# Patient Record
Sex: FEMALE | Race: WHITE | NOT HISPANIC OR LATINO | Employment: FULL TIME | URBAN - METROPOLITAN AREA
[De-identification: names, ages, dates, MRNs, and addresses within clinical notes are randomized per-mention and may not be internally consistent; named-entity substitution may affect disease eponyms.]

---

## 2018-02-21 ENCOUNTER — HOSPITAL ENCOUNTER (EMERGENCY)
Facility: HOSPITAL | Age: 38
Discharge: HOME | End: 2018-02-21
Attending: EMERGENCY MEDICINE | Admitting: EMERGENCY MEDICINE
Payer: COMMERCIAL

## 2018-02-21 VITALS
BODY MASS INDEX: 36.1 KG/M2 | TEMPERATURE: 98.3 F | WEIGHT: 230 LBS | HEIGHT: 67 IN | RESPIRATION RATE: 20 BRPM | SYSTOLIC BLOOD PRESSURE: 140 MMHG | HEART RATE: 80 BPM | OXYGEN SATURATION: 98 % | DIASTOLIC BLOOD PRESSURE: 70 MMHG

## 2018-02-21 DIAGNOSIS — R11.2 NAUSEA VOMITING AND DIARRHEA: Primary | ICD-10-CM

## 2018-02-21 DIAGNOSIS — R19.7 NAUSEA VOMITING AND DIARRHEA: Primary | ICD-10-CM

## 2018-02-21 PROCEDURE — 99283 EMERGENCY DEPT VISIT LOW MDM: CPT

## 2018-02-21 RX ORDER — ONDANSETRON 4 MG/1
4 TABLET, ORALLY DISINTEGRATING ORAL EVERY 6 HOURS PRN
Qty: 20 TABLET | Refills: 0 | Status: SHIPPED | OUTPATIENT
Start: 2018-02-21

## 2018-02-21 RX ORDER — ONDANSETRON 4 MG/1
4 TABLET, ORALLY DISINTEGRATING ORAL ONCE
Status: COMPLETED | OUTPATIENT
Start: 2018-02-21 | End: 2018-02-21

## 2018-02-21 RX ADMIN — ONDANSETRON 4 MG: 4 TABLET, ORALLY DISINTEGRATING ORAL at 23:12

## 2018-02-22 NOTE — DISCHARGE INSTRUCTIONS
Take the medication for nausea as needed  Drink plenty of fluids while you are sick, and eat as you are able to tolerate  You should not go back to work until you are no longer having vomiting and diarrhea  Follow up with the primary care doctor to make sure you are getting better  Return if you are no longer able to tolerate fluids, developed persistent fevers greater than 101, develop severe pain, or for any other concerns  Acute Nausea and Vomiting, Ambulatory Care   GENERAL INFORMATION:   Acute nausea and vomiting  starts suddenly, gets worse quickly, and lasts a short time  Nausea and vomiting may be caused by pregnancy, alcohol, infection, or medicines  Common related symptoms include the following:   · Fever    · Abdominal swelling    · Pain, tenderness, or a lump in the abdomen    · Splashing sounds heard in your stomach when you move  Seek immediate care for the following symptoms:   · Blood in your vomit or bowel movements    · Sudden, severe pain in your chest and upper abdomen after hard vomiting    · Dizziness, dry mouth, and thirst    · Urinating very little or not at all    · Muscle weakness, leg cramps, and trouble breathing    · A heart beat that is faster than normal    · Vomiting for more than 48 hours  Treatment for acute nausea and vomiting  may include medicines to calm your stomach and stop the vomiting  You may need IV fluids if you are dehydrated  Manage your nausea and vomiting:   · Drink liquids as directed to prevent dehydration  Ask how much liquid to drink each day and which liquids are best for you  You may need to drink an oral rehydration solution (ORS)  ORS contains water, salts, and sugar that are needed to replace the lost body fluids  Ask what kind of ORS to use, how much to drink, and where to get it  · Eat smaller meals, more often  Eat small amounts of food every 2 to 3 hours, even if you are not hungry   Food in your stomach may help decrease your nausea  · Avoid stress  Find ways to relax and manage your stress  Headaches due to stress may cause nausea and vomiting  Get more rest and sleep  Follow up with your healthcare provider as directed:  Write down your questions so you remember to ask them during your visits  CARE AGREEMENT:   You have the right to help plan your care  Learn about your health condition and how it may be treated  Discuss treatment options with your caregivers to decide what care you want to receive  You always have the right to refuse treatment  The above information is an  only  It is not intended as medical advice for individual conditions or treatments  Talk to your doctor, nurse or pharmacist before following any medical regimen to see if it is safe and effective for you  © 2014 [address] is for End User's use only and may not be sold, redistributed or otherwise used for commercial purposes  All illustrations and images included in CareNotes® are the copyrighted property of [employer] M [employer]  or [employer]  Acute Diarrhea   WHAT YOU NEED TO KNOW:   What is acute diarrhea? Acute diarrhea starts quickly and lasts a short time, usually 1 to 3 days  It can last up to 2 weeks  What causes acute diarrhea? · Bacteria, such as E coli or salmonella    · Viruses, such as rotavirus and norovirus    · A parasite, such as giardia    · Medicines, such as laxatives, antacids, or antibiotics    · An allergy to lactose, soy, or gluten    · Eating food or drinking water that contains germs    · Medical treatments, such as chemotherapy or radiation  What other signs and symptoms might I have with acute diarrhea? You may have 3 or more episodes of diarrhea  It may be hard to control your diarrhea   You may also have any of the following:  · Fever and chills    · Headache or abdominal pain    · Nausea and vomiting    · Symptoms of dehydration such as thirst, decreased urination, dry skin, sunken eyes, or fast, pounding heartbeat  What does my healthcare provider need to know about my acute diarrhea? Your healthcare provider will ask about your symptoms  He or she will ask what you have recently eaten and if you have traveled to other countries  Tell the provider what medicines you use or if you have been around anyone who is sick  Your healthcare provider may check you for signs of dehydration  How is acute diarrhea treated? Acute diarrhea usually gets better without treatment  You may need any of the following if your diarrhea is severe or lasts longer than a few days:  · Diarrhea medicine  is an over-the-counter medicine that helps slow or stop your diarrhea  · Antibiotics  may be given to help treat an infection caused by bacteria  · Parasite medicine  may be given to treat an infection caused by parasites  How can acute diarrhea be managed? · Drink liquids as directed  Liquids will help prevent dehydration caused by diarrhea  Ask your healthcare provider how much liquid to drink each day and which liquids are best for you  You may need to drink an oral rehydration solution (ORS)  An ORS has the right amounts of water, salts, and sugar you need to replace body fluids  You can buy an ORS at most grocery stores and pharmacies  · Eat foods that are easy to digest   Examples include rice, lentils, cereal, bananas, potatoes, and bread  It also includes some fruits (bananas, melon), well-cooked vegetables, and lean meats  Avoid foods high in fiber, fat, and sugar  Also avoid caffeine, alcohol, dairy, and red meat until your diarrhea is gone  How can acute diarrhea be prevented? · Wash your hands often  Use soap and water  Wash your hands before you eat or prepare food  Also wash your hands after you use the bathroom  Use an alcohol-based hand gel when soap and water are not available  · Keep bathroom surfaces clean    This helps prevent the spread of germs that cause acute diarrhea  · Wash fruits and vegetables well before you eat them  This can help remove germs that cause diarrhea  If possible, remove the skin from fruits and vegetables, or cook them well before you eat them  · Cook meat as directed  ¨ Cook ground meat  to 160°F      ¨ Cook ground poultry, whole poultry, or cuts of poultry  to at least 165°F  Remove the meat from heat  Let it stand for 3 minutes before you eat it  ¨ Cook whole cuts of meat other than poultry  to at least 145°F  Remove the meat from heat  Let it stand for 3 minutes before you eat it  · Wash dishes that have touched raw meat with hot water and soap  This includes cutting boards, utensils, dishes, and serving containers  · Place raw or cooked meat in the refrigerator as soon as possible  Bacteria can grow in meat that is left at room temperature too long  · Do not eat raw or undercooked oysters, clams, or mussels  These foods may be contaminated and cause infection  · Drink filtered or treated water only when you travel  Do not put ice in your drinks  Drink bottled water whenever possible  When should I seek immediate care? · You feel confused  · Your heartbeat is faster than normal      · Your eyes look deeply sunken, or you have no tears when you cry  · You urinate less than usual, or your urine is dark yellow  · You have blood or mucus in your stools  · You have severe abdominal pain  · You are unable to drink any liquids  When should I contact my healthcare provider? · Your symptoms do not get better with treatment  · You have a fever higher than 101 3°F (38 5°C)  · You have trouble eating and drinking because you are vomiting  · You are thirsty or have a dry mouth  · Your diarrhea does not get better in 7 days  · You have questions or concerns about your condition or care  CARE AGREEMENT:   You have the right to help plan your care   Learn about your health condition and how it may be treated  Discuss treatment options with your caregivers to decide what care you want to receive  You always have the right to refuse treatment  The above information is an  only  It is not intended as medical advice for individual conditions or treatments  Talk to your doctor, nurse or pharmacist before following any medical regimen to see if it is safe and effective for you  © 2017 [address] Information is for End User's use only and may not be sold, redistributed or otherwise used for commercial purposes  All illustrations and images included in CareNotes® are the copyrighted property of A D A M , Inc  or [address]

## 2019-01-10 ENCOUNTER — HOSPITAL ENCOUNTER (EMERGENCY)
Facility: HOSPITAL | Age: 39
Discharge: HOME | End: 2019-01-11
Attending: EMERGENCY MEDICINE | Admitting: EMERGENCY MEDICINE
Payer: COMMERCIAL

## 2019-01-10 ENCOUNTER — APPOINTMENT (EMERGENCY)
Dept: RADIOLOGY | Facility: HOSPITAL | Age: 39
End: 2019-01-10
Payer: COMMERCIAL

## 2019-01-10 VITALS
HEART RATE: 85 BPM | OXYGEN SATURATION: 97 % | DIASTOLIC BLOOD PRESSURE: 81 MMHG | RESPIRATION RATE: 18 BRPM | TEMPERATURE: 97.9 F | SYSTOLIC BLOOD PRESSURE: 147 MMHG

## 2019-01-10 DIAGNOSIS — M25.569 KNEE PAIN: Primary | ICD-10-CM

## 2019-01-10 DIAGNOSIS — S93.409A ANKLE SPRAIN: ICD-10-CM

## 2019-01-10 DIAGNOSIS — S83.90XA KNEE SPRAIN: ICD-10-CM

## 2019-01-10 PROCEDURE — 99283 EMERGENCY DEPT VISIT LOW MDM: CPT

## 2019-01-10 PROCEDURE — 73610 X-RAY EXAM OF ANKLE: CPT

## 2019-01-10 PROCEDURE — 73564 X-RAY EXAM KNEE 4 OR MORE: CPT

## 2019-01-10 RX ORDER — IBUPROFEN 600 MG/1
600 TABLET ORAL ONCE
Status: COMPLETED | OUTPATIENT
Start: 2019-01-10 | End: 2019-01-10

## 2019-01-10 RX ORDER — METHOCARBAMOL 500 MG/1
1000 TABLET, FILM COATED ORAL ONCE
Status: COMPLETED | OUTPATIENT
Start: 2019-01-10 | End: 2019-01-10

## 2019-01-10 RX ORDER — IBUPROFEN 600 MG/1
600 TABLET ORAL EVERY 6 HOURS PRN
Qty: 30 TABLET | Refills: 0 | Status: SHIPPED | OUTPATIENT
Start: 2019-01-10 | End: 2019-01-13

## 2019-01-10 RX ORDER — METHOCARBAMOL 500 MG/1
1000 TABLET, FILM COATED ORAL 2 TIMES DAILY
Qty: 30 TABLET | Refills: 0 | Status: SHIPPED | OUTPATIENT
Start: 2019-01-10

## 2019-01-10 RX ADMIN — IBUPROFEN 600 MG: 600 TABLET ORAL at 23:49

## 2019-01-10 RX ADMIN — METHOCARBAMOL TABLETS 1000 MG: 500 TABLET, COATED ORAL at 23:49

## 2019-01-11 NOTE — ED PROVIDER NOTES
History  Chief Complaint   Patient presents with    Knee Injury     Pt states she slipped down stairs this morning and hurt her right knee and ankle  Denies head injury or LOC     19-year-old female patient presents emergency department for evaluation of knee and ankle injury sustained in a fall down 15 steps early this morning  The patient was able to work for 6 hours straight on the injured foot and knee but wanted to make sure that nothing was broken so she came in today for evaluation  Radiographic studies ordered  History provided by:  Patient   used: No    Knee Pain   Location:  Knee and ankle  Knee location:  R knee  Ankle location:  R ankle  Pain details:     Quality:  Aching    Radiates to:  Does not radiate    Severity:  Mild    Onset quality:  Gradual    Timing:  Constant    Progression:  Worsening  Chronicity:  New  Prior injury to area:  Yes  Relieved by:  Nothing  Worsened by:  Nothing  Ineffective treatments:  None tried  Associated symptoms: no decreased ROM, no fever, no itching and no numbness    Risk factors: no concern for non-accidental trauma        Prior to Admission Medications   Prescriptions Last Dose Informant Patient Reported? Taking?   ondansetron (ZOFRAN-ODT) 4 mg disintegrating tablet   No No   Sig: Take 1 tablet (4 mg total) by mouth every 6 (six) hours as needed for nausea or vomiting      Facility-Administered Medications: None       History reviewed  No pertinent past medical history  History reviewed  No pertinent surgical history  History reviewed  No pertinent family history  I have reviewed and agree with the history as documented  Social History   Substance Use Topics    Smoking status: Current Every Day Smoker     Packs/day: 0 20     Types: Cigarettes    Smokeless tobacco: Never Used    Alcohol use No        Review of Systems   Constitutional: Negative for fever  Skin: Negative for itching     All other systems reviewed and are negative  Physical Exam  Physical Exam   Constitutional: She is oriented to person, place, and time  She appears well-developed and well-nourished  HENT:   Head: Normocephalic and atraumatic  Right Ear: External ear normal    Left Ear: External ear normal    Eyes: Conjunctivae and EOM are normal    Neck: No JVD present  No tracheal deviation present  No thyromegaly present  Cardiovascular: Normal rate  Pulmonary/Chest: Effort normal and breath sounds normal  No stridor  Abdominal: Soft  She exhibits no distension and no mass  There is no tenderness  There is no guarding  No hernia  Musculoskeletal: Normal range of motion  She exhibits no edema, tenderness or deformity  Lymphadenopathy:     She has no cervical adenopathy  Neurological: She is alert and oriented to person, place, and time  Skin: Skin is warm  No rash noted  No erythema  No pallor  Psychiatric: She has a normal mood and affect  Her behavior is normal    Nursing note and vitals reviewed        Vital Signs  ED Triage Vitals   Temperature Pulse Respirations Blood Pressure SpO2   01/10/19 2245 01/10/19 2241 01/10/19 2241 01/10/19 2241 01/10/19 2241   97 9 °F (36 6 °C) 85 18 147/81 97 %      Temp Source Heart Rate Source Patient Position - Orthostatic VS BP Location FiO2 (%)   01/10/19 2245 01/10/19 2241 01/10/19 2241 01/10/19 2241 --   Oral Monitor Lying Right arm       Pain Score       01/10/19 2241       Worst Possible Pain           Vitals:    01/10/19 2241   BP: 147/81   Pulse: 85   Patient Position - Orthostatic VS: Lying       Visual Acuity      ED Medications  Medications   methocarbamol (ROBAXIN) tablet 1,000 mg (1,000 mg Oral Given 1/10/19 2349)   ibuprofen (MOTRIN) tablet 600 mg (600 mg Oral Given 1/10/19 2349)       Diagnostic Studies  Results Reviewed     None                 XR ankle 3+ vw right   ED Interpretation by Nita Arrington DO (01/10 2341)   No acute fracture       by Randa Rodriguez (01/10 2336)      XR knee 4+ vw right injury   ED Interpretation by Hilario Davila DO (01/10 1497)   No acute osseous abnormality                 Procedures  Procedures       Phone Contacts  ED Phone Contact    ED Course                               MDM  Number of Diagnoses or Management Options  Ankle sprain: new and requires workup  Knee pain: new and requires workup  Knee sprain: new and requires workup     Amount and/or Complexity of Data Reviewed  Tests in the radiology section of CPT®: ordered and reviewed  Decide to obtain previous medical records or to obtain history from someone other than the patient: yes  Review and summarize past medical records: yes    Patient Progress  Patient progress: stable    CritCare Time    Disposition  Final diagnoses:   Knee pain   Ankle sprain   Knee sprain     Time reflects when diagnosis was documented in both MDM as applicable and the Disposition within this note     Time User Action Codes Description Comment    1/10/2019 11:42 PM Ramana Torrey Add [M25 [id number]] Knee pain     1/10/2019 11:42 PM Ramana Torrey Add [S93 409A] Ankle sprain     1/10/2019 11:43 PM Ramana Torrey Add [S83 [id number]] Knee sprain       ED Disposition     ED Disposition Condition Comment    Discharge  Rona Flynn discharge to home/self care      Condition at discharge: Good        Follow-up Information     Follow up With Specialties Details Why Contact Info    [facility], [address]   [address] [facility] [address]  [address]            Discharge Medication List as of 1/10/2019 11:44 PM      START taking these medications    Details   ibuprofen (MOTRIN) 600 mg tablet Take 1 tablet (600 mg total) by mouth every 6 (six) hours as needed for mild pain for up to 3 days, Starting Thu 1/10/2019, Until Sun 1/13/2019, Print      methocarbamol (ROBAXIN) 500 mg tablet Take 2 tablets (1,000 mg total) by mouth 2 (two) times a day, Starting Thu 1/10/2019, Print         CONTINUE these medications which have NOT CHANGED    Details   ondansetron (ZOFRAN-ODT) 4 mg disintegrating tablet Take 1 tablet (4 mg total) by mouth every 6 (six) hours as needed for nausea or vomiting, Starting Wed 2/21/2018, Print           No discharge procedures on file      ED Provider  Electronically Signed by           Arpan Fay,   01/11/19 110 Joe Becerril, DO  01/11/19 [id number]

## 2019-05-15 ENCOUNTER — HOSPITAL ENCOUNTER (EMERGENCY)
Facility: HOSPITAL | Age: 39
Discharge: HOME | End: 2019-05-16
Attending: EMERGENCY MEDICINE | Admitting: EMERGENCY MEDICINE
Payer: COMMERCIAL

## 2019-05-15 ENCOUNTER — APPOINTMENT (EMERGENCY)
Dept: RADIOLOGY | Facility: HOSPITAL | Age: 39
End: 2019-05-15
Payer: COMMERCIAL

## 2019-05-15 VITALS
BODY MASS INDEX: 44.5 KG/M2 | HEART RATE: 75 BPM | WEIGHT: 283.51 LBS | TEMPERATURE: 98.7 F | OXYGEN SATURATION: 98 % | RESPIRATION RATE: 18 BRPM | DIASTOLIC BLOOD PRESSURE: 89 MMHG | HEIGHT: 67 IN | SYSTOLIC BLOOD PRESSURE: 140 MMHG

## 2019-05-15 DIAGNOSIS — S63.502A LEFT WRIST SPRAIN, INITIAL ENCOUNTER: Primary | ICD-10-CM

## 2019-05-15 PROCEDURE — 99283 EMERGENCY DEPT VISIT LOW MDM: CPT

## 2019-05-15 PROCEDURE — 99283 EMERGENCY DEPT VISIT LOW MDM: CPT | Performed by: EMERGENCY MEDICINE

## 2019-05-15 PROCEDURE — 73110 X-RAY EXAM OF WRIST: CPT

## 2019-05-15 RX ORDER — IBUPROFEN 400 MG/1
400 TABLET ORAL ONCE
Status: DISCONTINUED | OUTPATIENT
Start: 2019-05-16 | End: 2019-05-16

## 2019-05-16 RX ORDER — IBUPROFEN 400 MG/1
400 TABLET ORAL ONCE
Status: COMPLETED | OUTPATIENT
Start: 2019-05-16 | End: 2019-05-16

## 2019-05-16 RX ORDER — SENNOSIDES 8.6 MG
650 CAPSULE ORAL EVERY 8 HOURS PRN
Qty: 12 TABLET | Refills: 0 | Status: SHIPPED | OUTPATIENT
Start: 2019-05-16 | End: 2019-05-20

## 2019-05-16 RX ORDER — IBUPROFEN 400 MG/1
400 TABLET ORAL EVERY 6 HOURS PRN
Qty: 16 TABLET | Refills: 0 | Status: SHIPPED | OUTPATIENT
Start: 2019-05-16 | End: 2019-05-20

## 2019-05-16 RX ADMIN — IBUPROFEN 400 MG: 400 TABLET ORAL at 00:21

## 2019-08-28 ENCOUNTER — HOSPITAL ENCOUNTER (EMERGENCY)
Facility: HOSPITAL | Age: 39
Discharge: HOME | End: 2019-08-29
Attending: EMERGENCY MEDICINE
Payer: COMMERCIAL

## 2019-08-28 VITALS
DIASTOLIC BLOOD PRESSURE: 92 MMHG | TEMPERATURE: 98.5 F | SYSTOLIC BLOOD PRESSURE: 161 MMHG | HEART RATE: 83 BPM | RESPIRATION RATE: 18 BRPM | OXYGEN SATURATION: 95 %

## 2019-08-28 DIAGNOSIS — J02.9 PHARYNGITIS: Primary | ICD-10-CM

## 2019-08-28 PROCEDURE — 99282 EMERGENCY DEPT VISIT SF MDM: CPT

## 2019-08-28 PROCEDURE — 99283 EMERGENCY DEPT VISIT LOW MDM: CPT | Performed by: EMERGENCY MEDICINE

## 2019-09-04 NOTE — ED PROVIDER NOTES
History  Chief Complaint   Patient presents with    Sore Throat     pt states she started with a sore throat this morning     40-year-old female presenting to the emergency department for evaluation of sore throat  Patient has had sore throat since this morning, wanted to make sure that she did not have anything that can be transmitted to her young son wanted to be treated with antibiotics, no fevers chills, no lymphadenopathy, does have cough  No other known sick contacts  Prior to Admission Medications   Prescriptions Last Dose Informant Patient Reported? Taking?   ibuprofen (MOTRIN) 400 mg tablet   No No   Sig: Take 1 tablet (400 mg total) by mouth every 6 (six) hours as needed for mild pain for up to 4 days   ibuprofen (MOTRIN) 600 mg tablet   No No   Sig: Take 1 tablet (600 mg total) by mouth every 6 (six) hours as needed for mild pain for up to 3 days   methocarbamol (ROBAXIN) 500 mg tablet   No No   Sig: Take 2 tablets (1,000 mg total) by mouth 2 (two) times a day   ondansetron (ZOFRAN-ODT) 4 mg disintegrating tablet   No No   Sig: Take 1 tablet (4 mg total) by mouth every 6 (six) hours as needed for nausea or vomiting      Facility-Administered Medications: None       History reviewed  No pertinent past medical history  History reviewed  No pertinent surgical history  History reviewed  No pertinent family history  I have reviewed and agree with the history as documented  Social History     Tobacco Use    Smoking status: Current Every Day Smoker     Packs/day: 0 20     Types: Cigarettes    Smokeless tobacco: Never Used   Substance Use Topics    Alcohol use: No    Drug use: No        Review of Systems   Constitutional: Negative for appetite change, chills, fatigue and fever  HENT: Positive for sore throat  Negative for sneezing  Eyes: Negative for visual disturbance  Respiratory: Negative for cough, choking, chest tightness, shortness of breath and wheezing      Cardiovascular: Negative for chest pain and palpitations  Gastrointestinal: Negative for abdominal pain, constipation, diarrhea, nausea and vomiting  Genitourinary: Negative for difficulty urinating and dysuria  Neurological: Negative for dizziness, weakness, light-headedness, numbness and headaches  All other systems reviewed and are negative  Physical Exam  Physical Exam   Constitutional: She is oriented to person, place, and time  She appears well-developed and well-nourished  No distress  HENT:   Head: Normocephalic and atraumatic  Mouth/Throat: Posterior oropharyngeal erythema present  Eyes: Pupils are equal, round, and reactive to light  EOM are normal    Neck: No JVD present  No tracheal deviation present  Cardiovascular: Normal rate, regular rhythm, normal heart sounds and intact distal pulses  Exam reveals no gallop and no friction rub  No murmur heard  Pulmonary/Chest: Effort normal and breath sounds normal  No respiratory distress  She has no wheezes  She has no rales  Abdominal: Soft  Bowel sounds are normal  She exhibits no distension  There is no tenderness  There is no rebound and no guarding  Neurological: She is alert and oriented to person, place, and time  No cranial nerve deficit  She exhibits normal muscle tone  Skin: Skin is warm and dry  She is not diaphoretic  No pallor  Psychiatric: She has a normal mood and affect  Her behavior is normal    Nursing note and vitals reviewed        Vital Signs  ED Triage Vitals   Temperature Pulse Respirations Blood Pressure SpO2   08/28/19 2340 08/28/19 2337 08/28/19 2337 08/28/19 2338 08/28/19 2337   98 5 °F (36 9 °C) 83 18 161/92 95 %      Temp Source Heart Rate Source Patient Position - Orthostatic VS BP Location FiO2 (%)   08/28/19 2337 08/28/19 2337 08/28/19 2337 08/28/19 2337 --   Oral Monitor Sitting Right arm       Pain Score       --                  Vitals:    08/28/19 2337 08/28/19 2338   BP:  161/92   Pulse: 83    Patient Position - Orthostatic VS: Sitting          Visual Acuity      ED Medications  Medications - No data to display    Diagnostic Studies  Results Reviewed     None                 No orders to display              Procedures  Procedures       ED Course                               MDM  Number of Diagnoses or Management Options  Pharyngitis:   Diagnosis management comments: 70-year-old female with pharyngitis, most likely viral, discussed that while there is no specific treatment other than supportive care, she should still be careful with covering her mouth or coughing, and frequent hand washing to avoid transmission of viral infection  Disposition  Final diagnoses:   Pharyngitis     Time reflects when diagnosis was documented in both MDM as applicable and the Disposition within this note     Time User Action Codes Description Comment    8/28/2019 11:48 PM Rajani Fitzpatrick Add [J02 9] Pharyngitis       ED Disposition     ED Disposition Condition Date/Time Comment    Discharge Stable Wed Aug 28, 2019 11:48 PM Rupali Girard discharge to home/self care              Follow-up Information    None         Discharge Medication List as of 8/28/2019 11:48 PM      CONTINUE these medications which have NOT CHANGED    Details   ibuprofen (MOTRIN) 400 mg tablet Take 1 tablet (400 mg total) by mouth every 6 (six) hours as needed for mild pain for up to 4 days, Starting Thu 5/16/2019, Until Mon 5/20/2019, Print      ibuprofen (MOTRIN) 600 mg tablet Take 1 tablet (600 mg total) by mouth every 6 (six) hours as needed for mild pain for up to 3 days, Starting Thu 1/10/2019, Until Sun 1/13/2019, Print      methocarbamol (ROBAXIN) 500 mg tablet Take 2 tablets (1,000 mg total) by mouth 2 (two) times a day, Starting Thu 1/10/2019, Print      ondansetron (ZOFRAN-ODT) 4 mg disintegrating tablet Take 1 tablet (4 mg total) by mouth every 6 (six) hours as needed for nausea or vomiting, Starting Wed 2/21/2018, Print           No discharge procedures on file     ED Provider  Electronically Signed by           Mandy Marie MD  09/04/19 [id number]

## 2020-08-16 ENCOUNTER — HOSPITAL ENCOUNTER (EMERGENCY)
Facility: HOSPITAL | Age: 40
Discharge: HOME | End: 2020-08-16
Attending: EMERGENCY MEDICINE
Payer: COMMERCIAL

## 2020-08-16 ENCOUNTER — APPOINTMENT (EMERGENCY)
Dept: RADIOLOGY | Facility: HOSPITAL | Age: 40
End: 2020-08-16
Payer: COMMERCIAL

## 2020-08-16 VITALS
SYSTOLIC BLOOD PRESSURE: 134 MMHG | TEMPERATURE: 98.2 F | OXYGEN SATURATION: 97 % | WEIGHT: 280 LBS | HEIGHT: 67 IN | BODY MASS INDEX: 43.95 KG/M2 | HEART RATE: 81 BPM | DIASTOLIC BLOOD PRESSURE: 100 MMHG | RESPIRATION RATE: 17 BRPM

## 2020-08-16 DIAGNOSIS — M72.2 PLANTAR FASCIITIS: Primary | ICD-10-CM

## 2020-08-16 PROCEDURE — 99284 EMERGENCY DEPT VISIT MOD MDM: CPT | Performed by: PHYSICIAN ASSISTANT

## 2020-08-16 PROCEDURE — 73610 X-RAY EXAM OF ANKLE: CPT

## 2020-08-16 PROCEDURE — 99283 EMERGENCY DEPT VISIT LOW MDM: CPT

## 2020-08-16 PROCEDURE — 73630 X-RAY EXAM OF FOOT: CPT

## 2020-08-16 RX ORDER — IBUPROFEN 400 MG/1
800 TABLET ORAL ONCE
Status: COMPLETED | OUTPATIENT
Start: 2020-08-16 | End: 2020-08-16

## 2020-08-16 RX ADMIN — IBUPROFEN 800 MG: 400 TABLET ORAL at 17:25

## 2020-08-16 NOTE — DISCHARGE INSTRUCTIONS
Take ibuprofen 400mg every 6 hours as needed for pain  Elevate your extremity and apply ice  Buy a sole insert for your feet  Follow-up with podiatry if no better in 2 weeks  Return to ER with any worsening symptoms

## 2020-08-16 NOTE — ED PROVIDER NOTES
History  Chief Complaint   Patient presents with    Foot Pain     Patient presents to ED with co right foot pain  Patient denies injuries  42yo female who is otherwise healthy presenting for right foot pain x 1 week  Patient denies known injury but states she works 70 hours per week in [employer] and is on her feet a lot  Patient is worried that she may have a stress fracture  Pain was worsening today while at work and her boss sent her here for evaluation  Pain is primarily located at the heel  Pain is worse in the morning and gradually improves throughout the day  Patient denies numbness, tingling, other symptoms  No previous injuries to the area  History provided by:  Patient   used: No    Ankle Pain   Location:  Foot  Time since incident:  1 week  Injury: no    Foot location:  R foot  Pain details:     Quality:  Pressure    Radiates to:  Does not radiate    Severity:  Moderate    Onset quality:  Gradual    Duration:  1 week    Timing:  Constant    Progression:  Worsening  Chronicity:  New  Dislocation: no    Prior injury to area:  No  Relieved by:  Nothing  Worsened by:  Bearing weight and activity  Ineffective treatments:  None tried  Associated symptoms: no back pain, no decreased ROM, no fatigue, no fever, no itching, no muscle weakness, no neck pain, no numbness, no stiffness, no swelling and no tingling        Prior to Admission Medications   Prescriptions Last Dose Informant Patient Reported?  Taking?   ibuprofen (MOTRIN) 400 mg tablet   No No   Sig: Take 1 tablet (400 mg total) by mouth every 6 (six) hours as needed for mild pain for up to 4 days   methocarbamol (ROBAXIN) 500 mg tablet   No No   Sig: Take 2 tablets (1,000 mg total) by mouth 2 (two) times a day   ondansetron (ZOFRAN-ODT) 4 mg disintegrating tablet   No No   Sig: Take 1 tablet (4 mg total) by mouth every 6 (six) hours as needed for nausea or vomiting      Facility-Administered Medications: None       History reviewed  No pertinent past medical history  History reviewed  No pertinent surgical history  History reviewed  No pertinent family history  I have reviewed and agree with the history as documented  E-Cigarette/Vaping    E-Cigarette Use Never User      E-Cigarette/Vaping Substances     Social History     Tobacco Use    Smoking status: Current Every Day Smoker     Packs/day: 0 25     Types: Cigarettes    Smokeless tobacco: Never Used   Substance Use Topics    Alcohol use: Yes     Frequency: Monthly or less     Drinks per session: 1 or 2    Drug use: No       Review of Systems   Constitutional: Negative for chills, fatigue and fever  HENT: Negative for drooling and voice change  Eyes: Negative for discharge and redness  Respiratory: Negative for shortness of breath and stridor  Cardiovascular: Negative for chest pain and leg swelling  Gastrointestinal: Negative for nausea and vomiting  Musculoskeletal: Positive for arthralgias  Negative for back pain, neck pain, neck stiffness and stiffness  Skin: Negative for color change, itching and rash  Neurological: Negative for seizures, syncope and numbness  Psychiatric/Behavioral: Negative for confusion  The patient is not nervous/anxious  All other systems reviewed and are negative  Physical Exam  Physical Exam  Vitals signs and nursing note reviewed  Constitutional:       General: She is not in acute distress  Appearance: She is well-developed  She is not diaphoretic  HENT:      Head: Normocephalic and atraumatic  Right Ear: External ear normal       Left Ear: External ear normal       Nose: Nose normal    Eyes:      General: No scleral icterus  Right eye: No discharge  Left eye: No discharge  Conjunctiva/sclera: Conjunctivae normal    Neck:      Musculoskeletal: Normal range of motion and neck supple  Cardiovascular:      Rate and Rhythm: Normal rate and regular rhythm        Pulses: Dorsalis pedis pulses are 2+ on the right side and 2+ on the left side  Heart sounds: Normal heart sounds  No murmur  Pulmonary:      Effort: Pulmonary effort is normal  No respiratory distress  Breath sounds: Normal breath sounds  No stridor  No wheezing or rales  Abdominal:      General: Bowel sounds are normal  There is no distension  Palpations: Abdomen is soft  Tenderness: There is no abdominal tenderness  There is no guarding  Musculoskeletal: Normal range of motion  General: No deformity  Comments: Right foot: Foot appears normal without deformity, skin changes, or swelling  She has pain primarily at the calcaneus and is worsened with plantar flexion  2+ DP pulse and distal sensation intact  Lymphadenopathy:      Cervical: No cervical adenopathy  Skin:     General: Skin is warm and dry  Neurological:      Mental Status: She is alert  She is not disoriented  GCS: GCS eye subscore is 4  GCS verbal subscore is 5  GCS motor subscore is 6     Psychiatric:         Behavior: Behavior normal          Vital Signs  ED Triage Vitals [08/16/20 1651]   Temperature Pulse Respirations Blood Pressure SpO2   98 2 °F (36 8 °C) 81 17 134/100 97 %      Temp Source Heart Rate Source Patient Position - Orthostatic VS BP Location FiO2 (%)   Oral Monitor Sitting Left arm --      Pain Score       Worst Possible Pain           Vitals:    08/16/20 1651   BP: 134/100   Pulse: 81   Patient Position - Orthostatic VS: Sitting         Visual Acuity      ED Medications  Medications   ibuprofen (MOTRIN) tablet 800 mg (800 mg Oral Given 8/16/20 1725)       Diagnostic Studies  Results Reviewed     None                 XR foot 3+ views RIGHT   ED Interpretation by Kareem Irealnd PA-C (08/16 1733)   No acute osseous abnormality      XR ankle 3+ views RIGHT   ED Interpretation by Kareem Ireland PA-C (08/16 1733)   No acute osseous abnormality                 Procedures  Procedures ED Course       US AUDIT      Most Recent Value   Initial Alcohol Screen: US AUDIT-C    1  How often do you have a drink containing alcohol? 1 Filed at: 08/16/2020 6777   2  How many drinks containing alcohol do you have on a typical day you are drinking? 1 Filed at: 08/16/2020 1653   Audit-C Score  2 Filed at: 08/16/2020 1653                  CHARLEY/DAST-10      Most Recent Value   How many times in the past year have you    Used an illegal drug or used a prescription medication for non-medical reasons? Never Filed at: 08/16/2020 1653                                MDM  Number of Diagnoses or Management Options  Plantar fasciitis: new and requires workup  Diagnosis management comments: 42yo female presenting with atraumatic right foot pain x 1 week  Patient works as a  and is on her feet for 70+ hours a week  Pain was worsening today which prompted her to seek medical attention  No numbness or tingling  Foot appears normal on exam and is neurovascularly intact  She has pain at the calcaneus and pain with plantar flexion  X-rays obtained which were negative for acute fracture  Suspect symptoms are related to plantar fasciitis  Advised PRN ibuprofen and sole inserts  Podiatry referral given for continued symptoms  ED return precautions discussed  Patient expressed understanding and is agreeable to plan  Patient discharged in stable condition           Amount and/or Complexity of Data Reviewed  Tests in the radiology section of CPT®: ordered and reviewed  Independent visualization of images, tracings, or specimens: yes    Risk of Complications, Morbidity, and/or Mortality  Presenting problems: low  Diagnostic procedures: low  Management options: low    Patient Progress  Patient progress: stable        Disposition  Final diagnoses:   Plantar fasciitis     Time reflects when diagnosis was documented in both MDM as applicable and the Disposition within this note     Time User Action Codes Description Comment    8/16/2020  5:43 PM Estela Navarro Add [M72 2] Plantar fasciitis       ED Disposition     ED Disposition Condition Date/Time Comment    Discharge Stable Sun Aug 16, 2020  5:43 PM Pankaj Lim discharge to home/self care  Follow-up Information     Follow up With Specialties Details Why Contact Info Additional [address] Vanesa Amaro, DPM Podiatry  As needed [address]  [address] [phone]       [address] Emergency Department Emergency Medicine  If symptoms worsen [facility] Aidan Bailey [address], [address] [place], [place], [address]          Discharge Medication List as of 8/16/2020  5:44 PM      CONTINUE these medications which have NOT CHANGED    Details   ibuprofen (MOTRIN) 400 mg tablet Take 1 tablet (400 mg total) by mouth every 6 (six) hours as needed for mild pain for up to 4 days, Starting Thu 5/16/2019, Until Mon 5/20/2019, Print      methocarbamol (ROBAXIN) 500 mg tablet Take 2 tablets (1,000 mg total) by mouth 2 (two) times a day, Starting Thu 1/10/2019, Print      ondansetron (ZOFRAN-ODT) 4 mg disintegrating tablet Take 1 tablet (4 mg total) by mouth every 6 (six) hours as needed for nausea or vomiting, Starting Wed 2/21/2018, Print           No discharge procedures on file      PDMP Review     None          ED Provider  Electronically Signed by           Shakira Samuel PA-C  08/16/20 8302

## 2020-08-16 NOTE — Clinical Note
Donna Ocasio was seen and treated in our emergency department on 8/16/2020  Diagnosis:     Neeraj eLe  may return to work on return date  She may return on 08/20/2020  If you have any questions or concerns, please don't hesitate to call        Jasmine Isbell PA-C    ______________________________           _______________          _______________  Hospital Representative                              Date                                Time

## 2021-11-24 ENCOUNTER — HOSPITAL ENCOUNTER (EMERGENCY)
Facility: HOSPITAL | Age: 41
Discharge: HOME | End: 2021-11-24
Attending: EMERGENCY MEDICINE
Payer: COMMERCIAL

## 2021-11-24 ENCOUNTER — APPOINTMENT (EMERGENCY)
Dept: RADIOLOGY | Facility: HOSPITAL | Age: 41
End: 2021-11-24
Payer: COMMERCIAL

## 2021-11-24 VITALS
TEMPERATURE: 98.4 F | HEART RATE: 66 BPM | OXYGEN SATURATION: 99 % | BODY MASS INDEX: 42.38 KG/M2 | HEIGHT: 67 IN | SYSTOLIC BLOOD PRESSURE: 156 MMHG | DIASTOLIC BLOOD PRESSURE: 89 MMHG | WEIGHT: 270 LBS | RESPIRATION RATE: 17 BRPM

## 2021-11-24 DIAGNOSIS — R07.89 CHEST WALL PAIN: Primary | ICD-10-CM

## 2021-11-24 LAB
ANION GAP SERPL CALCULATED.3IONS-SCNC: 9 MMOL/L (ref 4–13)
BASOPHILS # BLD AUTO: 0.05 THOUSANDS/ΜL (ref 0–0.1)
BASOPHILS NFR BLD AUTO: 1 % (ref 0–1)
BUN SERPL-MCNC: 9 MG/DL (ref 5–25)
CALCIUM SERPL-MCNC: 8.1 MG/DL (ref 8.3–10.1)
CARDIAC TROPONIN I PNL SERPL HS: <2 NG/L
CARDIAC TROPONIN I PNL SERPL HS: <2 NG/L
CHLORIDE SERPL-SCNC: 103 MMOL/L (ref 100–108)
CO2 SERPL-SCNC: 26 MMOL/L (ref 21–32)
CREAT SERPL-MCNC: 0.74 MG/DL (ref 0.6–1.3)
EOSINOPHIL # BLD AUTO: 0.1 THOUSAND/ΜL (ref 0–0.61)
EOSINOPHIL NFR BLD AUTO: 1 % (ref 0–6)
ERYTHROCYTE [DISTWIDTH] IN BLOOD BY AUTOMATED COUNT: 13.4 % (ref 11.6–15.1)
GFR SERPL CREATININE-BSD FRML MDRD: 101 ML/MIN/1.73SQ M
GLUCOSE SERPL-MCNC: 108 MG/DL (ref 65–140)
HCT VFR BLD AUTO: 42.4 % (ref 34.8–46.1)
HGB BLD-MCNC: 13.2 G/DL (ref 11.5–15.4)
IMM GRANULOCYTES # BLD AUTO: 0.02 THOUSAND/UL (ref 0–0.2)
IMM GRANULOCYTES NFR BLD AUTO: 0 % (ref 0–2)
INR PPP: 0.98 (ref 0.84–1.19)
LYMPHOCYTES # BLD AUTO: 2.23 THOUSANDS/ΜL (ref 0.6–4.47)
LYMPHOCYTES NFR BLD AUTO: 27 % (ref 14–44)
MCH RBC QN AUTO: 27.3 PG (ref 26.8–34.3)
MCHC RBC AUTO-ENTMCNC: 31.1 G/DL (ref 31.4–37.4)
MCV RBC AUTO: 88 FL (ref 82–98)
MONOCYTES # BLD AUTO: 0.63 THOUSAND/ΜL (ref 0.17–1.22)
MONOCYTES NFR BLD AUTO: 8 % (ref 4–12)
NEUTROPHILS # BLD AUTO: 5.32 THOUSANDS/ΜL (ref 1.85–7.62)
NEUTS SEG NFR BLD AUTO: 63 % (ref 43–75)
NRBC BLD AUTO-RTO: 0 /100 WBCS
PLATELET # BLD AUTO: 352 THOUSANDS/UL (ref 149–390)
PMV BLD AUTO: 10.4 FL (ref 8.9–12.7)
POTASSIUM SERPL-SCNC: 4.6 MMOL/L (ref 3.5–5.3)
PROTHROMBIN TIME: 12.6 SECONDS (ref 11.6–14.5)
RBC # BLD AUTO: 4.84 MILLION/UL (ref 3.81–5.12)
SODIUM SERPL-SCNC: 138 MMOL/L (ref 136–145)
WBC # BLD AUTO: 8.35 THOUSAND/UL (ref 4.31–10.16)

## 2021-11-24 PROCEDURE — 84484 ASSAY OF TROPONIN QUANT: CPT | Performed by: EMERGENCY MEDICINE

## 2021-11-24 PROCEDURE — 99285 EMERGENCY DEPT VISIT HI MDM: CPT | Performed by: EMERGENCY MEDICINE

## 2021-11-24 PROCEDURE — 71046 X-RAY EXAM CHEST 2 VIEWS: CPT

## 2021-11-24 PROCEDURE — 80048 BASIC METABOLIC PNL TOTAL CA: CPT | Performed by: EMERGENCY MEDICINE

## 2021-11-24 PROCEDURE — 36415 COLL VENOUS BLD VENIPUNCTURE: CPT | Performed by: EMERGENCY MEDICINE

## 2021-11-24 PROCEDURE — 93005 ELECTROCARDIOGRAM TRACING: CPT

## 2021-11-24 PROCEDURE — 99285 EMERGENCY DEPT VISIT HI MDM: CPT

## 2021-11-24 PROCEDURE — 85610 PROTHROMBIN TIME: CPT | Performed by: EMERGENCY MEDICINE

## 2021-11-24 PROCEDURE — 85025 COMPLETE CBC W/AUTO DIFF WBC: CPT | Performed by: EMERGENCY MEDICINE

## 2021-11-24 RX ORDER — OXYCODONE HYDROCHLORIDE AND ACETAMINOPHEN 5; 325 MG/1; MG/1
1 TABLET ORAL ONCE
Status: COMPLETED | OUTPATIENT
Start: 2021-11-24 | End: 2021-11-24

## 2021-11-24 RX ORDER — OXYCODONE HYDROCHLORIDE AND ACETAMINOPHEN 5; 325 MG/1; MG/1
1 TABLET ORAL EVERY 6 HOURS PRN
Qty: 10 TABLET | Refills: 0 | Status: SHIPPED | OUTPATIENT
Start: 2021-11-24

## 2021-11-24 RX ADMIN — OXYCODONE AND ACETAMINOPHEN 1 TABLET: 5; 325 TABLET ORAL at 12:43

## 2021-11-25 LAB
ATRIAL RATE: 67 BPM
ATRIAL RATE: 70 BPM
P AXIS: 46 DEGREES
P AXIS: 46 DEGREES
PR INTERVAL: 138 MS
PR INTERVAL: 140 MS
QRS AXIS: 64 DEGREES
QRS AXIS: 74 DEGREES
QRSD INTERVAL: 86 MS
QRSD INTERVAL: 88 MS
QT INTERVAL: 396 MS
QT INTERVAL: 424 MS
QTC INTERVAL: 427 MS
QTC INTERVAL: 448 MS
T WAVE AXIS: 40 DEGREES
T WAVE AXIS: 56 DEGREES
VENTRICULAR RATE: 67 BPM
VENTRICULAR RATE: 70 BPM

## 2021-11-25 PROCEDURE — 93010 ELECTROCARDIOGRAM REPORT: CPT | Performed by: INTERNAL MEDICINE

## 2022-07-19 ENCOUNTER — APPOINTMENT (EMERGENCY)
Dept: RADIOLOGY | Facility: HOSPITAL | Age: 42
End: 2022-07-19
Payer: COMMERCIAL

## 2022-07-19 ENCOUNTER — HOSPITAL ENCOUNTER (EMERGENCY)
Facility: HOSPITAL | Age: 42
Discharge: HOME | End: 2022-07-19
Attending: EMERGENCY MEDICINE | Admitting: EMERGENCY MEDICINE
Payer: COMMERCIAL

## 2022-07-19 VITALS
SYSTOLIC BLOOD PRESSURE: 141 MMHG | RESPIRATION RATE: 18 BRPM | HEART RATE: 87 BPM | DIASTOLIC BLOOD PRESSURE: 71 MMHG | OXYGEN SATURATION: 100 % | TEMPERATURE: 98.7 F

## 2022-07-19 DIAGNOSIS — M25.552 LEFT HIP PAIN: ICD-10-CM

## 2022-07-19 DIAGNOSIS — T14.8XXA MUSCLE STRAIN: ICD-10-CM

## 2022-07-19 DIAGNOSIS — M79.18 PAIN IN LEFT BUTTOCK: ICD-10-CM

## 2022-07-19 DIAGNOSIS — R03.0 ELEVATED BLOOD PRESSURE READING: ICD-10-CM

## 2022-07-19 PROCEDURE — 96372 THER/PROPH/DIAG INJ SC/IM: CPT

## 2022-07-19 PROCEDURE — 99284 EMERGENCY DEPT VISIT MOD MDM: CPT | Performed by: EMERGENCY MEDICINE

## 2022-07-19 PROCEDURE — 73502 X-RAY EXAM HIP UNI 2-3 VIEWS: CPT

## 2022-07-19 PROCEDURE — 99283 EMERGENCY DEPT VISIT LOW MDM: CPT

## 2022-07-19 RX ORDER — CYCLOBENZAPRINE HCL 10 MG
5 TABLET ORAL 3 TIMES DAILY PRN
Qty: 21 TABLET | Refills: 0 | Status: SHIPPED | OUTPATIENT
Start: 2022-07-19

## 2022-07-19 RX ORDER — KETOROLAC TROMETHAMINE 30 MG/ML
30 INJECTION, SOLUTION INTRAMUSCULAR; INTRAVENOUS ONCE
Status: COMPLETED | OUTPATIENT
Start: 2022-07-19 | End: 2022-07-19

## 2022-07-19 RX ORDER — IBUPROFEN 800 MG/1
800 TABLET ORAL EVERY 8 HOURS PRN
Qty: 60 TABLET | Refills: 0 | Status: SHIPPED | OUTPATIENT
Start: 2022-07-19

## 2022-07-19 RX ADMIN — KETOROLAC TROMETHAMINE 30 MG: 30 INJECTION, SOLUTION INTRAMUSCULAR at 11:55

## 2022-07-19 NOTE — ED PROVIDER NOTES
History  Chief Complaint   Patient presents with    Leg Pain     Slipped on wet floor, didn't fall but feels as though when she jerked to catch herself she injured her L upper posterior leg/buttock      HPI    Prior to Admission Medications   Prescriptions Last Dose Informant Patient Reported? Taking?   ibuprofen (MOTRIN) 400 mg tablet   No No   Sig: Take 1 tablet (400 mg total) by mouth every 6 (six) hours as needed for mild pain for up to 4 days   methocarbamol (ROBAXIN) 500 mg tablet   No No   Sig: Take 2 tablets (1,000 mg total) by mouth 2 (two) times a day   ondansetron (ZOFRAN-ODT) 4 mg disintegrating tablet   No No   Sig: Take 1 tablet (4 mg total) by mouth every 6 (six) hours as needed for nausea or vomiting   oxyCODONE-acetaminophen (PERCOCET) 5-325 mg per tablet   No No   Sig: Take 1 tablet by mouth every 6 (six) hours as needed for severe pain for up to 10 doses Max Daily Amount: 4 tablets      Facility-Administered Medications: None       No past medical history on file  No past surgical history on file  No family history on file  I have reviewed and agree with the history as documented  E-Cigarette/Vaping    E-Cigarette Use Never User      E-Cigarette/Vaping Substances     Social History     Tobacco Use    Smoking status: Current Every Day Smoker     Packs/day: 0 25     Types: Cigarettes    Smokeless tobacco: Never Used   Vaping Use    Vaping Use: Never used   Substance Use Topics    Alcohol use: Yes    Drug use: No       Review of Systems    Physical Exam  Physical Exam  Vitals and nursing note reviewed  Constitutional:       General: She is not in acute distress  Appearance: She is well-developed  She is morbidly obese  Comments: Hypertensive, improves on repeat   HENT:      Head: Normocephalic and atraumatic  Eyes:      Conjunctiva/sclera: Conjunctivae normal       Pupils: Pupils are equal, round, and reactive to light  Neck:      Trachea: No tracheal deviation  Cardiovascular:      Rate and Rhythm: Normal rate and regular rhythm  Pulses:           Dorsalis pedis pulses are 2+ on the left side  Pulmonary:      Effort: Pulmonary effort is normal  No respiratory distress  Musculoskeletal:      Cervical back: Normal range of motion  Lumbar back: Tenderness present  No bony tenderness  Back:       Left hip: Tenderness present  No bony tenderness or crepitus  Decreased range of motion (mild, due to pain, worsening pain with internal rotation)  Left upper leg: No tenderness  Left knee: No bony tenderness  No tenderness  Left lower leg: No tenderness  Left ankle: No tenderness  Normal range of motion  Legs:    Skin:     General: Skin is warm and dry  Neurological:      Mental Status: She is alert and oriented to person, place, and time  GCS: GCS eye subscore is 4  GCS verbal subscore is 5  GCS motor subscore is 6     Psychiatric:         Behavior: Behavior normal          Vital Signs  ED Triage Vitals   Temperature Pulse Respirations Blood Pressure SpO2   07/19/22 1055 07/19/22 1055 07/19/22 1055 07/19/22 1055 07/19/22 1055   97 8 °F (36 6 °C) 87 20 (!) 187/85 98 %      Temp Source Heart Rate Source Patient Position - Orthostatic VS BP Location FiO2 (%)   07/19/22 1055 07/19/22 1055 07/19/22 1055 07/19/22 1055 --   Tympanic Monitor Sitting Left arm       Pain Score       07/19/22 1155       8           Vitals:    07/19/22 1055 07/19/22 1300   BP: (!) 187/85 141/71   Pulse: 87 87   Patient Position - Orthostatic VS: Sitting           Visual Acuity      ED Medications  Medications   ketorolac (TORADOL) injection 30 mg (30 mg Intramuscular Given 7/19/22 1155)       Diagnostic Studies  Results Reviewed     None                 XR hip/pelv 2-3 vws left    (Results Pending)              Procedures  Procedures         ED Course                               SBIRT 20yo+    Flowsheet Row Most Recent Value   SBIRT (23 yo +)    In order to provide better care to our patients, we are screening all of our patients for alcohol and drug use  Would it be okay to ask you these screening questions? Yes Filed at: 07/19/2022 1108   Initial Alcohol Screen: US AUDIT-C     1  How often do you have a drink containing alcohol? 0 Filed at: 07/19/2022 1108   2  How many drinks containing alcohol do you have on a typical day you are drinking? 0 Filed at: 07/19/2022 1108   3a  Male UNDER 65: How often do you have five or more drinks on one occasion? 0 Filed at: 07/19/2022 1108   3b  FEMALE Any Age, or MALE 65+: How often do you have 4 or more drinks on one occassion? 0 Filed at: 07/19/2022 1108   Audit-C Score 0 Filed at: 07/19/2022 1108   CHARLEY: How many times in the past year have you    Used an illegal drug or used a prescription medication for non-medical reasons? Never Filed at: 07/19/2022 1108                    MDM  Number of Diagnoses or Management Options  Elevated blood pressure reading: new and requires workup  Left hip pain: new and requires workup  Muscle strain: new and requires workup  Pain in left buttock: new and requires workup  Diagnosis management comments: This is a 80-year-old female who presents here today for evaluation of pain around her left hip  She says two days ago she slipped on her right foot, and caught herself with her left foot before she actually fell  She says she felt like something twisted and pulled, but is uncertain of exactly which way her leg went  She says she has been taking ibuprofen with minimal improvement  She says last night she was given a dose of muscle relaxer from her mother  She thinks it helped a little bit but says it did allow her to sleep  She has tried topical lidocaine which did not help  She denies blunt injuries to the area, or prior problems with the left hip  She says at times the pain radiates down the leg to her foot    She says her toes feel tingly but denies loss of sensation or associated weakness  Pain is worse with walking and movement, and she had a hard time walking up the stairs yesterday  She denies any other injuries or complaints  Review of systems:  Otherwise negative unless stated as above    She is well-appearing, in no acute distress  She has tenderness to the lower left gluteal region, as well as around the hip  She has worsening of pain with internal rotation  She is neurovascular intact distally  Exam is otherwise unremarkable  This is most likely muscular injury, with surrounding inflammation causing irritation of the sciatic nerve in certain positions, however we will get an x-ray to evaluate for underlying bony injury especially given tenderness along the groin and lateral hip, as there may be possible avulsion fracture from underlying muscular injury  X-ray was read by myself, shows no acute abnormalities  I discussed with her findings, treatment at home, follow-up, and indications for return, and she expresses understanding with this plan  Of note, she was hypertensive initial triage which did improve on repeat  She endorses a remote history of high blood pressure but had side effects related to the medications, so stopped taking them  She checks her blood pressure intermittently but says it is fine when she does  I advised her that it is likely due to pain and being in the emergency department, but to continue monitoring at home, as if she has persistently elevated blood pressures may need to be restarted on medications         Amount and/or Complexity of Data Reviewed  Tests in the radiology section of CPT®: ordered and reviewed  Independent visualization of images, tracings, or specimens: yes        Disposition  Final diagnoses:   Left hip pain   Pain in left buttock   Muscle strain   Elevated blood pressure reading     Time reflects when diagnosis was documented in both MDM as applicable and the Disposition within this note     Time User Action Codes Description Comment    7/19/2022 12:55 PM Aguila-Portillo Felix Opitz Add [M25 552] Left hip pain     7/19/2022 12:55 PM Aguila-TessudhaeroIndyte Annetz Add [M79 18] Pain in left buttock     7/19/2022 12:55 PM Aguila-Shaunaero Antoniette Opitz Add Leontine Dolores  8XXA] Muscle strain     7/19/2022 12:57 PM Aguila-ShaunaeroIndyte Opitz Add [R03 0] Elevated blood pressure reading     7/19/2022 12:59 PM Aguila-ShaunaeroIndyte Opitz Modify [M25 552] Left hip pain     7/19/2022 12:59 PM Aguila-ShaunaeroIndyte Opitz Modify [M79 18] Pain in left buttock     7/19/2022 12:59 PM Aguila-ShaunaeroPortillotz Modify [T14  8XXA] Muscle strain     7/19/2022 12:59 PM Aguila-ShaunaeroIndyte Opitz Modify [R03 0] Elevated blood pressure reading       ED Disposition     ED Disposition   Discharge    Condition   Stable    Date/Time   Tue Jul 19, 2022 [address] discharge to home/self care           Follow-up Information     Follow up With Specialties Details Why Contact Info    your primary care doctor  Schedule an appointment as soon as possible for a visit  to follow up on your symptoms and blood pressure     your orthopedist  Schedule an appointment as soon as possible for a visit  to follow up on your pain     Augusta Salvage, DO Family Medicine Schedule an appointment as soon as possible for a visit  to set up care with a new primary care doctor if you do not have one [address]            Patient's Medications   Discharge Prescriptions    CYCLOBENZAPRINE (FLEXERIL) 10 MG TABLET    Take 0 5 tablets (5 mg total) by mouth 3 (three) times a day as needed for muscle spasms       Start Date: 7/19/2022 End Date: --       Order Dose: 5 mg       Quantity: 21 tablet    Refills: 0    IBUPROFEN (IBU) 800 MG TABLET    Take 1 tablet (800 mg total) by mouth every 8 (eight) hours as needed (pain)       Start Date: 7/19/2022 End Date: --       Order Dose: 800 mg       Quantity: 60 tablet Refills: 0       No discharge procedures on file      PDMP Review     None          ED Provider  Electronically Signed by           David Martell MD  07/19/22 0066

## 2022-07-19 NOTE — DISCHARGE INSTRUCTIONS
Take ibuprofen (Motrin, Advil) or acetaminophen (Tylenol) as needed for pain, as per the instructions  Use ice or heat as it helps  Use topical medications, such as Keshav-Izaguirre or icy Hot, to the area, to help with the pain  Do stretching exercises to keep the muscles loose  Take the muscle relaxer as needed for continued severe pain  Do your normal daily activities, but avoid strenuous activities or heavy lifting until you begin to feel better  The underlying muscle injury is likely irritating the sciatic nerve and causing the pain down your leg  Follow-up with Orthopedics for further evaluation  Your blood pressure was elevated here  Monitor it home, as if it stays elevated your doctor may need to restart you on blood pressure medications

## 2022-07-19 NOTE — Clinical Note
Garth Swannnilesh was seen and treated in our emergency department on 7/19/2022  Diagnosis:     Naa Velez  may return to work on return date  She may return on this date: 07/21/2022         If you have any questions or concerns, please don't hesitate to call        Jaswinder Garza MD    ______________________________           _______________          _______________  Hospital Representative                              Date                                Time